# Patient Record
Sex: FEMALE | Race: WHITE | Employment: UNEMPLOYED | URBAN - METROPOLITAN AREA
[De-identification: names, ages, dates, MRNs, and addresses within clinical notes are randomized per-mention and may not be internally consistent; named-entity substitution may affect disease eponyms.]

---

## 2021-10-25 ENCOUNTER — APPOINTMENT (OUTPATIENT)
Dept: CT IMAGING | Age: 21
End: 2021-10-25
Payer: COMMERCIAL

## 2021-10-25 ENCOUNTER — APPOINTMENT (OUTPATIENT)
Dept: GENERAL RADIOLOGY | Age: 21
End: 2021-10-25
Payer: COMMERCIAL

## 2021-10-25 ENCOUNTER — HOSPITAL ENCOUNTER (EMERGENCY)
Age: 21
Discharge: HOME | End: 2021-10-25
Attending: EMERGENCY MEDICINE
Payer: COMMERCIAL

## 2021-10-25 VITALS
SYSTOLIC BLOOD PRESSURE: 149 MMHG | DIASTOLIC BLOOD PRESSURE: 66 MMHG | HEART RATE: 84 BPM | OXYGEN SATURATION: 98 % | TEMPERATURE: 98 F | RESPIRATION RATE: 16 BRPM

## 2021-10-25 DIAGNOSIS — K52.9 COLITIS: ICD-10-CM

## 2021-10-25 DIAGNOSIS — R10.31 ABDOMINAL PAIN, RIGHT LOWER QUADRANT: Primary | ICD-10-CM

## 2021-10-25 LAB
A/G RATIO: 1.1 (ref 1.1–2.2)
ABO/RH: NORMAL
ALBUMIN SERPL-MCNC: 3.9 G/DL (ref 3.4–5)
ALP BLD-CCNC: 64 U/L (ref 40–129)
ALT SERPL-CCNC: 18 U/L (ref 10–40)
ANION GAP SERPL CALCULATED.3IONS-SCNC: 12 MMOL/L (ref 3–16)
AST SERPL-CCNC: 14 U/L (ref 15–37)
BACTERIA: ABNORMAL /HPF
BASOPHILS ABSOLUTE: 0.1 K/UL (ref 0–0.2)
BASOPHILS RELATIVE PERCENT: 0.7 %
BILIRUB SERPL-MCNC: <0.2 MG/DL (ref 0–1)
BILIRUBIN URINE: NEGATIVE
BLOOD, URINE: NEGATIVE
BUN BLDV-MCNC: 7 MG/DL (ref 7–20)
CALCIUM SERPL-MCNC: 9.3 MG/DL (ref 8.3–10.6)
CHLORIDE BLD-SCNC: 110 MMOL/L (ref 99–110)
CLARITY: ABNORMAL
CO2: 20 MMOL/L (ref 21–32)
COLOR: YELLOW
CREAT SERPL-MCNC: 0.6 MG/DL (ref 0.6–1.1)
EOSINOPHILS ABSOLUTE: 0.4 K/UL (ref 0–0.6)
EOSINOPHILS RELATIVE PERCENT: 5.6 %
EPITHELIAL CELLS, UA: 7 /HPF (ref 0–5)
GFR AFRICAN AMERICAN: >60
GFR NON-AFRICAN AMERICAN: >60
GLOBULIN: 3.5 G/DL
GLUCOSE BLD-MCNC: 123 MG/DL (ref 70–99)
GLUCOSE URINE: NEGATIVE MG/DL
GONADOTROPIN, CHORIONIC (HCG) QUANT: <5 MIU/ML
HCT VFR BLD CALC: 38.2 % (ref 36–48)
HEMOGLOBIN: 12.7 G/DL (ref 12–16)
HYALINE CASTS: 2 /LPF (ref 0–8)
KETONES, URINE: NEGATIVE MG/DL
LEUKOCYTE ESTERASE, URINE: NEGATIVE
LIPASE: 40 U/L (ref 13–60)
LYMPHOCYTES ABSOLUTE: 1.9 K/UL (ref 1–5.1)
LYMPHOCYTES RELATIVE PERCENT: 23.2 %
MCH RBC QN AUTO: 27.5 PG (ref 26–34)
MCHC RBC AUTO-ENTMCNC: 33.2 G/DL (ref 31–36)
MCV RBC AUTO: 83 FL (ref 80–100)
MICROSCOPIC EXAMINATION: YES
MONOCYTES ABSOLUTE: 0.5 K/UL (ref 0–1.3)
MONOCYTES RELATIVE PERCENT: 5.7 %
NEUTROPHILS ABSOLUTE: 5.2 K/UL (ref 1.7–7.7)
NEUTROPHILS RELATIVE PERCENT: 64.8 %
NITRITE, URINE: NEGATIVE
PDW BLD-RTO: 14 % (ref 12.4–15.4)
PH UA: 5.5 (ref 5–8)
PLATELET # BLD: 208 K/UL (ref 135–450)
PMV BLD AUTO: 10.1 FL (ref 5–10.5)
POTASSIUM REFLEX MAGNESIUM: 4 MMOL/L (ref 3.5–5.1)
PRO-BNP: 33 PG/ML (ref 0–124)
PROTEIN UA: 100 MG/DL
RBC # BLD: 4.61 M/UL (ref 4–5.2)
RBC UA: 1 /HPF (ref 0–4)
SODIUM BLD-SCNC: 142 MMOL/L (ref 136–145)
SPECIFIC GRAVITY UA: 1.02 (ref 1–1.03)
TOTAL PROTEIN: 7.4 G/DL (ref 6.4–8.2)
TROPONIN: <0.01 NG/ML
URINE REFLEX TO CULTURE: ABNORMAL
URINE TYPE: ABNORMAL
UROBILINOGEN, URINE: 0.2 E.U./DL
WBC # BLD: 8 K/UL (ref 4–11)
WBC UA: 5 /HPF (ref 0–5)

## 2021-10-25 PROCEDURE — 99283 EMERGENCY DEPT VISIT LOW MDM: CPT

## 2021-10-25 PROCEDURE — 6360000004 HC RX CONTRAST MEDICATION: Performed by: PHYSICIAN ASSISTANT

## 2021-10-25 PROCEDURE — 84702 CHORIONIC GONADOTROPIN TEST: CPT

## 2021-10-25 PROCEDURE — 84484 ASSAY OF TROPONIN QUANT: CPT

## 2021-10-25 PROCEDURE — 85025 COMPLETE CBC W/AUTO DIFF WBC: CPT

## 2021-10-25 PROCEDURE — 6370000000 HC RX 637 (ALT 250 FOR IP): Performed by: PHYSICIAN ASSISTANT

## 2021-10-25 PROCEDURE — 86900 BLOOD TYPING SEROLOGIC ABO: CPT

## 2021-10-25 PROCEDURE — 83880 ASSAY OF NATRIURETIC PEPTIDE: CPT

## 2021-10-25 PROCEDURE — 93005 ELECTROCARDIOGRAM TRACING: CPT | Performed by: PHYSICIAN ASSISTANT

## 2021-10-25 PROCEDURE — 74177 CT ABD & PELVIS W/CONTRAST: CPT

## 2021-10-25 PROCEDURE — 86901 BLOOD TYPING SEROLOGIC RH(D): CPT

## 2021-10-25 PROCEDURE — 71045 X-RAY EXAM CHEST 1 VIEW: CPT

## 2021-10-25 PROCEDURE — 81001 URINALYSIS AUTO W/SCOPE: CPT

## 2021-10-25 PROCEDURE — 80053 COMPREHEN METABOLIC PANEL: CPT

## 2021-10-25 PROCEDURE — 83690 ASSAY OF LIPASE: CPT

## 2021-10-25 RX ORDER — ACETAMINOPHEN 500 MG
1000 TABLET ORAL ONCE
Status: COMPLETED | OUTPATIENT
Start: 2021-10-25 | End: 2021-10-25

## 2021-10-25 RX ORDER — METRONIDAZOLE 500 MG/1
500 TABLET ORAL 3 TIMES DAILY
Qty: 30 TABLET | Refills: 0 | Status: SHIPPED | OUTPATIENT
Start: 2021-10-25 | End: 2021-11-04

## 2021-10-25 RX ORDER — ONDANSETRON 4 MG/1
4 TABLET, ORALLY DISINTEGRATING ORAL EVERY 8 HOURS PRN
Qty: 20 TABLET | Refills: 0 | Status: SHIPPED | OUTPATIENT
Start: 2021-10-25

## 2021-10-25 RX ORDER — DOCUSATE SODIUM 100 MG/1
100 CAPSULE, LIQUID FILLED ORAL 2 TIMES DAILY
Qty: 60 CAPSULE | Refills: 0 | Status: SHIPPED | OUTPATIENT
Start: 2021-10-25 | End: 2021-11-24

## 2021-10-25 RX ORDER — DICYCLOMINE HYDROCHLORIDE 10 MG/1
10 CAPSULE ORAL EVERY 6 HOURS PRN
Qty: 20 CAPSULE | Refills: 0 | Status: SHIPPED | OUTPATIENT
Start: 2021-10-25

## 2021-10-25 RX ORDER — CIPROFLOXACIN 500 MG/1
500 TABLET, FILM COATED ORAL 2 TIMES DAILY
Qty: 20 TABLET | Refills: 0 | Status: SHIPPED | OUTPATIENT
Start: 2021-10-25 | End: 2021-11-04

## 2021-10-25 RX ADMIN — ACETAMINOPHEN 1000 MG: 500 TABLET ORAL at 14:33

## 2021-10-25 RX ADMIN — IOPAMIDOL 75 ML: 755 INJECTION, SOLUTION INTRAVENOUS at 17:13

## 2021-10-25 ASSESSMENT — ENCOUNTER SYMPTOMS
VOMITING: 0
PHOTOPHOBIA: 0
DIARRHEA: 0
CHEST TIGHTNESS: 0
COUGH: 0
COLOR CHANGE: 0
BACK PAIN: 0
SHORTNESS OF BREATH: 1
CONSTIPATION: 0
ABDOMINAL PAIN: 1
NAUSEA: 1

## 2021-10-25 ASSESSMENT — PAIN SCALES - GENERAL
PAINLEVEL_OUTOF10: 10
PAINLEVEL_OUTOF10: 10

## 2021-10-25 NOTE — ED PROVIDER NOTES
Phone [phone]   CBC WITH AUTO DIFFERENTIAL    Narrative:     Performed at:  [facility] [address] [facility],  [place], [address]   Phone [phone]   LIPASE    Narrative:     Performed at:  [facility]  [address] [facility],  [place], [address]   Phone [phone]   HCG, QUANTITATIVE, PREGNANCY    Narrative:     Performed at:  [facility] [address],  [place], [address]   Phone [phone]   TROPONIN    Narrative:     Performed at:  [facility] [address],  [place], [address]   Phone [phone]   BRAIN NATRIURETIC PEPTIDE    Narrative:     Performed at:  [facility] [address] [facility],  [place], [address]   Phone [phone]   ABO/RH    Narrative:     Performed at:  [facility] [address],  [place], [address]   Phone [phone]               Exam:    Well-nourished female sitting up in bed in no acute distress. Abdomen is soft and benign with no guarding or rebound. Medical decision making:    The patient has remained stable throughout her hospital course. Her work-up was extensive including a pregnancy test that was negative. A CT of the abdomen pelvis shows some possible colitis. The radiologist also stated that tumor is on the differential however, the patient is having normal bowel movements. All of these findings were discussed with the patient. The patient was discharged in stable condition with antibiotics for possible colitis. She is to return if worse. FINAL IMPRESSION:    1. Abdominal pain, right lower quadrant    2.  Colitis            Glory Hernandez MD  10/25/21 0893

## 2021-10-25 NOTE — ED PROVIDER NOTES
[address] [facility]        Pt Name: Sandra Lovett  MRN: [id number]  [place] 2000  Date of evaluation: 10/25/2021  Provider: BECK Stuart  PCP: No primary care provider on file. Note Started: 2:45 PM EDT        I have seen and evaluated this patient with my supervising physician Katie Recinos MD.    CHIEF COMPLAINT       Chief Complaint   Patient presents with    Emesis     states LMP 3 months ago, believes she is pregnant, never took test, vomiting for a few weeks       HISTORY OF PRESENT ILLNESS   (Location, Timing/Onset, Context/Setting, Quality, Duration, Modifying Factors, Severity, Associated Signs and Symptoms)  Note limiting factors. Chief Complaint: Nausea/questionable pregnancy    Sandra Lovett is a 22 y.o. female with no significant past medical history who presents the ED with complaint of possible pregnancy with associated nausea. Patient states she has not had a menstrual cycle in about the past 3 months. States she believes her last menstrual cycle was on July 16, 2021. Patient states over the past month she has had some pain to her right-sided abdomen intermittently. Patient states she is also had associated nausea but denies any vomiting. States she is also had some feelings of shortness of breath with swelling to her hands and legs. Denies any calf pain, headache, lightheadedness/dizziness, seizure, visual changes or speech disturbances. Denies any numbness or tingling. Denies any vaginal bleeding or discharge. Denies any dysuria, frequency, urgency or hematuria. Denies any changes in bowel movements. Denies fever chills. Denies any rashes or lesions. Patient states her family is concerned that she is pregnant. Patient states if she was pregnant today she would be G3, P1 Ab1.   Patient states she had previous tubal pregnancy on the right where she states they did take out her right \"tube\". Has not establish care with OB/GYN at this time. Has not been seen for evaluation in this pregnancy. Nursing Notes were all reviewed and agreed with or any disagreements were addressed in the HPI. REVIEW OF SYSTEMS    (2-9 systems for level 4, 10 or more for level 5)     Review of Systems   Constitutional: Negative for activity change, appetite change, chills, diaphoresis, fatigue and fever. Eyes: Negative for photophobia and visual disturbance. Respiratory: Positive for shortness of breath. Negative for cough and chest tightness. Cardiovascular: Positive for leg swelling. Negative for chest pain and palpitations. Gastrointestinal: Positive for abdominal pain and nausea. Negative for constipation, diarrhea and vomiting. Genitourinary: Negative for decreased urine volume, difficulty urinating, dysuria, flank pain, frequency, hematuria, menstrual problem, pelvic pain, urgency, vaginal bleeding, vaginal discharge and vaginal pain. Musculoskeletal: Negative for arthralgias, back pain, myalgias, neck pain and neck stiffness. Skin: Negative for color change, pallor, rash and wound. Neurological: Negative for dizziness, tremors, seizures, syncope, facial asymmetry, speech difficulty, weakness, light-headedness, numbness and headaches. Positives and Pertinent negatives as per HPI. Except as noted above in the ROS, all other systems were reviewed and negative. PAST MEDICAL HISTORY   History reviewed. No pertinent past medical history. SURGICAL HISTORY   History reviewed. No pertinent surgical history. CURRENTMEDICATIONS       Previous Medications    No medications on file         ALLERGIES     Patient has no known allergies. FAMILYHISTORY     History reviewed. No pertinent family history.        SOCIAL HISTORY       Social History     Tobacco Use    Smoking status: Never Smoker    Smokeless tobacco: Never Used   Substance Use Topics    Alcohol use: Never    Drug use: Not on file       SCREENINGS             PHYSICAL EXAM    (up to 7 for level 4, 8 or more for level 5)     ED Triage Vitals [10/25/21 1417]   BP Temp Temp src Pulse Resp SpO2 Height Weight   (!) 149/66 98 °F (36.7 °C) -- 84 16 98 % -- --       Physical Exam  Constitutional:       General: She is not in acute distress. Appearance: Normal appearance. She is well-developed. She is not ill-appearing, toxic-appearing or diaphoretic. HENT:      Head: Normocephalic and atraumatic. Right Ear: External ear normal.      Left Ear: External ear normal.   Eyes:      General:         Right eye: No discharge. Left eye: No discharge. Extraocular Movements: Extraocular movements intact. Conjunctiva/sclera: Conjunctivae normal.      Pupils: Pupils are equal, round, and reactive to light. Cardiovascular:      Rate and Rhythm: Normal rate and regular rhythm. Pulses: Normal pulses. Heart sounds: Normal heart sounds. No murmur heard. No friction rub. No gallop. Comments: 2+ radial pulses bilaterally. No pedal edema. No calf tenderness. No JVD. Pulmonary:      Effort: Pulmonary effort is normal. No respiratory distress. Breath sounds: Normal breath sounds. No stridor. No wheezing, rhonchi or rales. Chest:      Chest wall: No tenderness. Abdominal:      General: Abdomen is flat. Bowel sounds are normal. There is no distension. Palpations: Abdomen is soft. There is no mass. Tenderness: There is abdominal tenderness in the right upper quadrant, right lower quadrant, epigastric area, periumbilical area and suprapubic area. There is no right CVA tenderness, left CVA tenderness, guarding or rebound. Positive signs include McBurney's sign. Negative signs include Allen's sign, Rovsing's sign, psoas sign and obturator sign. Hernia: No hernia is present. Musculoskeletal:         General: Normal range of motion.       Cervical back: Normal range of motion and neck supple. Skin:     General: Skin is warm and dry. Coloration: Skin is not pale. Findings: No erythema or rash. Neurological:      General: No focal deficit present. Mental Status: She is alert and oriented to person, place, and time. GCS: GCS eye subscore is 4. GCS verbal subscore is 5. GCS motor subscore is 6. Cranial Nerves: Cranial nerves are intact. No cranial nerve deficit, dysarthria or facial asymmetry. Sensory: Sensation is intact. No sensory deficit. Motor: Motor function is intact. No weakness. Coordination: Coordination is intact. Gait: Gait is intact.  Gait normal.   Psychiatric:         Behavior: Behavior normal.         DIAGNOSTIC RESULTS   LABS:    Labs Reviewed   COMPREHENSIVE METABOLIC PANEL W/ REFLEX TO MG FOR LOW K - Abnormal; Notable for the following components:       Result Value    CO2 20 (*)     Glucose 123 (*)     AST 14 (*)     All other components within normal limits    Narrative:     Performed at:  [facility] [address]   Phone [phone]   URINE RT REFLEX TO CULTURE - Abnormal; Notable for the following components:    Clarity, UA CLOUDY (*)     Protein,  (*)     All other components within normal limits    Narrative:     Performed at:  [facility] [address]  [place]   Phone [phone]   MICROSCOPIC URINALYSIS - Abnormal; Notable for the following components:    Bacteria, UA 1+ (*)     Epithelial Cells, UA 7 (*)     All other components within normal limits    Narrative:     Performed at:  [facility] [address],  [place], [place]   Phone [phone]   CBC WITH AUTO DIFFERENTIAL    Narrative:     Performed at:  [facility] [address], [place], [place]   Phone [phone]   LIPASE    Narrative:     Performed at:  [facility]) - [facility]  [address], [address]   Phone [phone]   HCG, QUANTITATIVE, PREGNANCY    Narrative:     Performed at:  [facility]  [address], [address]   Phone [phone]   TROPONIN    Narrative:     Performed at:  [facility]  [address] [facility],  [place], [address]   Phone [phone]   BRAIN NATRIURETIC PEPTIDE    Narrative:     Performed at:  [facility]  [address] [facility],  [place], [address]   Phone [phone]   ABO/RH    Narrative:     Performed at:  [facility]  [address] [address]   Phone [phone]       When ordered only abnormal lab results are displayed. All other labs were within normal range or not returned as of this dictation. EKG: When ordered, EKG's are interpreted by the Emergency Department Physician in the absence of a cardiologist.  Please see their note for interpretation of EKG. RADIOLOGY:   Non-plain film images such as CT, Ultrasound and MRI are read by the radiologist. Plain radiographic images are visualized and preliminarily interpreted by the ED Provider with the below findings:        Interpretation per the Radiologist below, if available at the time of this note:    CT ABDOMEN PELVIS W IV CONTRAST Additional Contrast? None   Final Result   1. Questionable wall thickening of the cecum with suboptimal evaluation due   to lack of enteric contrast.  Inspissated stool, colitis, and tumor are in   the differential.   2. No appendicitis, diverticulitis, or small bowel obstruction. 3. Other findings as described. XR CHEST PORTABLE   Final Result   No active cardiopulmonary disease           No results found.         PROCEDURES   Unless otherwise noted below, none     Procedures    CRITICAL CARE TIME   N/A    CONSULTS:  None      EMERGENCY DEPARTMENT COURSE and DIFFERENTIAL DIAGNOSIS/MDM:   Vitals:    Vitals:    10/25/21 1417   BP: (!) 149/66   Pulse: 84   Resp: 16   Temp: 98 °F (36.7 °C)   SpO2: 98%       Patient was given the following medications:  Medications   acetaminophen (TYLENOL) tablet 1,000 mg (1,000 mg Oral Given 10/25/21 1433)   iopamidol (ISOVUE-370) 76 % injection 75 mL (75 mLs IntraVENous Given 10/25/21 1713)           Patient is a 81-year-old female who presents to the ED with complaint of abdominal pain. Complaint of lower abdominal pain. States she thinks she believes she is pregnant. Has not had a menstrual cycle since July. IV established and blood work obtained. Patient given Tylenol here in the ED for symptom control. CBC showed a white count, hemoglobin and platelets. CMP relatively unremarkable. Lipase normal.  Blood type a positive. hCG quant was essentially negative. Troponin was normal.  BNP unremarkable. Urinalysis showed no signs of infection. CT of the abdomen pelvis showed questionable wall thickening to the cecum. Could be due to tumor versus colitis versus stool. No evidence of appendicitis or small bowel obstruction. EKG inter by attending. Chest x-ray unremarkable. Patient does not appear to be pregnant. Suffering from right lower quadrant belly pain which potentially could be due to underlying colitis. We will discharge home with Cipro and Flagyl. We will give Colace, Bentyl and Zofran for symptom control. Follow-up with PCP. Given referral.  Return the ED for any worsening symptoms. Low suspicion for obstruction, peritonitis, perforation, pancreatitis, cholecystitis, sinusitis, diverticulitis, mesenteric ischemia, AAA, dissection, pyelonephritis, nephrolithiasis, ovarian etiology, ectopic pregnancy or other emergent etiology at this time. FINAL IMPRESSION      1. Abdominal pain, right lower quadrant    2.  Colitis          DISPOSITION/PLAN   DISPOSITION Discharge - Pending Orders Complete 10/25/2021 06:53:47 PM      PATIENT REFERRED TO:  [facility] Emergency Department  [address]  [address]  Go to   As needed, If symptoms worsen    [facility]) Pre-Services  [phone]          DISCHARGE MEDICATIONS:  New Prescriptions    CIPROFLOXACIN (CIPRO) 500 MG TABLET    Take 1 tablet by mouth 2 times daily for 10 days    DICYCLOMINE (BENTYL) 10 MG CAPSULE    Take 1 capsule by mouth every 6 hours as needed (cramps)    DOCUSATE SODIUM (COLACE) 100 MG CAPSULE    Take 1 capsule by mouth 2 times daily    METRONIDAZOLE (FLAGYL) 500 MG TABLET    Take 1 tablet by mouth 3 times daily for 10 days    ONDANSETRON (ZOFRAN ODT) 4 MG DISINTEGRATING TABLET    Take 1 tablet by mouth every 8 hours as needed for Nausea       DISCONTINUED MEDICATIONS:  Discontinued Medications    No medications on file              (Please note that portions of this note were completed with a voice recognition program.  Efforts were made to edit the dictations but occasionally words are mis-transcribed.)    BECK Woods (electronically signed)         BECK Gant  10/25/21 5340

## 2021-10-26 LAB
EKG ATRIAL RATE: 67 BPM
EKG DIAGNOSIS: NORMAL
EKG P AXIS: 35 DEGREES
EKG P-R INTERVAL: 138 MS
EKG Q-T INTERVAL: 374 MS
EKG QRS DURATION: 98 MS
EKG QTC CALCULATION (BAZETT): 395 MS
EKG R AXIS: 60 DEGREES
EKG T AXIS: 28 DEGREES
EKG VENTRICULAR RATE: 67 BPM

## 2021-10-26 PROCEDURE — 93010 ELECTROCARDIOGRAM REPORT: CPT | Performed by: INTERNAL MEDICINE

## 2022-07-29 ENCOUNTER — APPOINTMENT (OUTPATIENT)
Dept: CT IMAGING | Age: 22
End: 2022-07-29
Payer: COMMERCIAL

## 2022-07-29 ENCOUNTER — HOSPITAL ENCOUNTER (EMERGENCY)
Age: 22
Discharge: HOME | End: 2022-07-29
Attending: EMERGENCY MEDICINE
Payer: COMMERCIAL

## 2022-07-29 VITALS
OXYGEN SATURATION: 98 % | TEMPERATURE: 98.5 F | BODY MASS INDEX: 34.53 KG/M2 | RESPIRATION RATE: 22 BRPM | HEART RATE: 96 BPM | WEIGHT: 220 LBS | SYSTOLIC BLOOD PRESSURE: 105 MMHG | HEIGHT: 67 IN | DIASTOLIC BLOOD PRESSURE: 72 MMHG

## 2022-07-29 DIAGNOSIS — R10.9 LEFT FLANK PAIN: Primary | ICD-10-CM

## 2022-07-29 LAB
A/G RATIO: 1.4 (ref 1.1–2.2)
ABO/RH: NORMAL
ALBUMIN SERPL-MCNC: 4.3 G/DL (ref 3.4–5)
ALP BLD-CCNC: 59 U/L (ref 40–129)
ALT SERPL-CCNC: 13 U/L (ref 10–40)
ANION GAP SERPL CALCULATED.3IONS-SCNC: 9 MMOL/L (ref 3–16)
ANTIBODY SCREEN: NORMAL
AST SERPL-CCNC: 15 U/L (ref 15–37)
BACTERIA: NORMAL /HPF
BASOPHILS ABSOLUTE: 0.1 K/UL (ref 0–0.2)
BASOPHILS RELATIVE PERCENT: 0.5 %
BILIRUB SERPL-MCNC: <0.2 MG/DL (ref 0–1)
BILIRUBIN URINE: NEGATIVE
BLOOD, URINE: NEGATIVE
BUN BLDV-MCNC: 11 MG/DL (ref 7–20)
CALCIUM SERPL-MCNC: 8.8 MG/DL (ref 8.3–10.6)
CHLORIDE BLD-SCNC: 103 MMOL/L (ref 99–110)
CLARITY: CLEAR
CO2: 25 MMOL/L (ref 21–32)
COLOR: YELLOW
CREAT SERPL-MCNC: <0.5 MG/DL (ref 0.6–1.1)
EOSINOPHILS ABSOLUTE: 0.4 K/UL (ref 0–0.6)
EOSINOPHILS RELATIVE PERCENT: 3.4 %
EPITHELIAL CELLS, UA: 1 /HPF (ref 0–5)
GFR AFRICAN AMERICAN: >60
GFR NON-AFRICAN AMERICAN: >60
GLUCOSE BLD-MCNC: 120 MG/DL (ref 70–99)
GLUCOSE URINE: NEGATIVE MG/DL
GONADOTROPIN, CHORIONIC (HCG) QUANT: <5 MIU/ML
HCG(URINE) PREGNANCY TEST: NEGATIVE
HCT VFR BLD CALC: 37.7 % (ref 36–48)
HEMOGLOBIN: 12.2 G/DL (ref 12–16)
HYALINE CASTS: 0 /LPF (ref 0–8)
KETONES, URINE: NEGATIVE MG/DL
LEUKOCYTE ESTERASE, URINE: ABNORMAL
LYMPHOCYTES ABSOLUTE: 2.3 K/UL (ref 1–5.1)
LYMPHOCYTES RELATIVE PERCENT: 21.1 %
MCH RBC QN AUTO: 27 PG (ref 26–34)
MCHC RBC AUTO-ENTMCNC: 32.2 G/DL (ref 31–36)
MCV RBC AUTO: 83.8 FL (ref 80–100)
MICROSCOPIC EXAMINATION: YES
MONOCYTES ABSOLUTE: 0.8 K/UL (ref 0–1.3)
MONOCYTES RELATIVE PERCENT: 6.8 %
NEUTROPHILS ABSOLUTE: 7.5 K/UL (ref 1.7–7.7)
NEUTROPHILS RELATIVE PERCENT: 68.2 %
NITRITE, URINE: NEGATIVE
PDW BLD-RTO: 14.8 % (ref 12.4–15.4)
PH UA: 7 (ref 5–8)
PLATELET # BLD: 223 K/UL (ref 135–450)
PMV BLD AUTO: 9 FL (ref 5–10.5)
POTASSIUM SERPL-SCNC: 4 MMOL/L (ref 3.5–5.1)
PROTEIN UA: 30 MG/DL
RBC # BLD: 4.5 M/UL (ref 4–5.2)
RBC UA: 3 /HPF (ref 0–4)
SODIUM BLD-SCNC: 137 MMOL/L (ref 136–145)
SPECIFIC GRAVITY UA: 1.02 (ref 1–1.03)
TOTAL PROTEIN: 7.4 G/DL (ref 6.4–8.2)
URINE TYPE: ABNORMAL
UROBILINOGEN, URINE: 2 E.U./DL
WBC # BLD: 11.1 K/UL (ref 4–11)
WBC UA: 1 /HPF (ref 0–5)

## 2022-07-29 PROCEDURE — 99284 EMERGENCY DEPT VISIT MOD MDM: CPT

## 2022-07-29 PROCEDURE — 80053 COMPREHEN METABOLIC PANEL: CPT

## 2022-07-29 PROCEDURE — 96374 THER/PROPH/DIAG INJ IV PUSH: CPT

## 2022-07-29 PROCEDURE — 84703 CHORIONIC GONADOTROPIN ASSAY: CPT

## 2022-07-29 PROCEDURE — 86900 BLOOD TYPING SEROLOGIC ABO: CPT

## 2022-07-29 PROCEDURE — 86901 BLOOD TYPING SEROLOGIC RH(D): CPT

## 2022-07-29 PROCEDURE — 86850 RBC ANTIBODY SCREEN: CPT

## 2022-07-29 PROCEDURE — 6370000000 HC RX 637 (ALT 250 FOR IP): Performed by: EMERGENCY MEDICINE

## 2022-07-29 PROCEDURE — 85025 COMPLETE CBC W/AUTO DIFF WBC: CPT

## 2022-07-29 PROCEDURE — 96375 TX/PRO/DX INJ NEW DRUG ADDON: CPT

## 2022-07-29 PROCEDURE — 84702 CHORIONIC GONADOTROPIN TEST: CPT

## 2022-07-29 PROCEDURE — 6360000002 HC RX W HCPCS: Performed by: EMERGENCY MEDICINE

## 2022-07-29 PROCEDURE — 81001 URINALYSIS AUTO W/SCOPE: CPT

## 2022-07-29 PROCEDURE — 36415 COLL VENOUS BLD VENIPUNCTURE: CPT

## 2022-07-29 PROCEDURE — 74176 CT ABD & PELVIS W/O CONTRAST: CPT

## 2022-07-29 RX ORDER — ACETAMINOPHEN 500 MG
1000 TABLET ORAL ONCE
Status: COMPLETED | OUTPATIENT
Start: 2022-07-29 | End: 2022-07-29

## 2022-07-29 RX ORDER — ONDANSETRON 2 MG/ML
4 INJECTION INTRAMUSCULAR; INTRAVENOUS EVERY 6 HOURS PRN
Status: DISCONTINUED | OUTPATIENT
Start: 2022-07-29 | End: 2022-07-29 | Stop reason: HOSPADM

## 2022-07-29 RX ORDER — KETOROLAC TROMETHAMINE 10 MG/1
10 TABLET, FILM COATED ORAL EVERY 6 HOURS PRN
Qty: 20 TABLET | Refills: 0 | Status: SHIPPED | OUTPATIENT
Start: 2022-07-29

## 2022-07-29 RX ORDER — KETOROLAC TROMETHAMINE 30 MG/ML
15 INJECTION, SOLUTION INTRAMUSCULAR; INTRAVENOUS ONCE
Status: COMPLETED | OUTPATIENT
Start: 2022-07-29 | End: 2022-07-29

## 2022-07-29 RX ADMIN — KETOROLAC TROMETHAMINE 15 MG: 30 INJECTION, SOLUTION INTRAMUSCULAR at 03:11

## 2022-07-29 RX ADMIN — ONDANSETRON 4 MG: 2 INJECTION INTRAMUSCULAR; INTRAVENOUS at 02:04

## 2022-07-29 RX ADMIN — ACETAMINOPHEN 1000 MG: 500 TABLET ORAL at 02:03

## 2022-07-29 ASSESSMENT — PAIN SCALES - GENERAL
PAINLEVEL_OUTOF10: 3
PAINLEVEL_OUTOF10: 10
PAINLEVEL_OUTOF10: 10

## 2022-07-29 ASSESSMENT — PAIN DESCRIPTION - ORIENTATION
ORIENTATION: LEFT

## 2022-07-29 ASSESSMENT — PAIN DESCRIPTION - LOCATION
LOCATION: RIB CAGE

## 2022-07-29 ASSESSMENT — PAIN DESCRIPTION - FREQUENCY: FREQUENCY: CONTINUOUS

## 2022-07-29 ASSESSMENT — PAIN - FUNCTIONAL ASSESSMENT: PAIN_FUNCTIONAL_ASSESSMENT: 0-10

## 2022-07-29 NOTE — ED PROVIDER NOTES
[address] [facility]        Pt Name: Shari Orozco  MRN: [id number]  [place] 2000  Date of evaluation: 7/29/2022  Provider: Sari Najera MD  PCP: No primary care provider on file. This patient was seen and evaluated by the attending physician Sari Najera MD.      [address] [place] [address]       Chief Complaint   Patient presents with    Rib Pain (injury)     Pt arrived via [facility] from home w c/o of left sided pain that started 5 days ago from rib cage and progressed top lower body. Current pain level (10/10)       HISTORY OF PRESENT ILLNESS   (Location/Symptom, Timing/Onset, Context/Setting, Quality, Duration, Modifying Factors, Severity)  Note limiting factors. Shari Orozco is a 22 y.o. female today with chief complaint of left-sided flank pain for the past few days. Not much of a cogent historian. From what I can work out, she does complain of left-sided flank pain intermittently for the past week. LMP of June 19. No fevers chills sweats nausea running or diarrhea. No VB or Discharge. Has been having unprotected sex. Nursing Notes were all reviewed and agreed with or any disagreements were addressed  in the HPI. REVIEW OF SYSTEMS    (2-9 systems for level 4, 10 or more for level 5)     Review of Systems    Positives and Pertinent negatives as per HPI. Except as noted abovein the ROS, all other systems were reviewed and negative. PAST MEDICAL HISTORY   History reviewed. No pertinent past medical history. SURGICAL HISTORY   History reviewed. No pertinent surgical history.       CURRENTMEDICATIONS       Previous Medications    DICYCLOMINE (BENTYL) 10 MG CAPSULE    Take 1 capsule by mouth every 6 hours as needed (cramps)    ONDANSETRON (ZOFRAN ODT) 4 MG DISINTEGRATING TABLET    Take 1 tablet by mouth every 8 hours as needed for Nausea         ALLERGIES     Patient has no known allergies. FAMILYHISTORY     History reviewed. No pertinent family history. SOCIAL HISTORY       Social History     Socioeconomic History    Marital status: Single     Spouse name: None    Number of children: None    Years of education: None    Highest education level: None   Tobacco Use    Smoking status: Every Day     Types: Cigarettes    Smokeless tobacco: Never   Substance and Sexual Activity    Alcohol use: Never    Drug use: Never       SCREENINGS    [place] Coma Scale  Eye Opening: Spontaneous  Best Verbal Response: Oriented  Best Motor Response: Obeys commands  [place] Coma Scale Score: 15        PHYSICAL EXAM    (up to 7 for level 4, 8 or more for level 5)     ED Triage Vitals   BP Temp Temp src Pulse Resp SpO2 Height Weight   -- -- -- -- -- -- -- --       Physical Exam    General Appearance:  Alert, cooperative, no distress, appears stated age. Head:  Normocephalic, without obvious abnormality, atraumatic. Eyes:  conjunctiva/corneas clear, EOM's intact. Sclera anicteric. ENT: Mucous membranes moist.   Neck: Supple, symmetrical, trachea midline, no adenopathy. No jugular venous distention. Lungs:   No Respiratory Distress. Chest Wall:  Atraumatic   Heart:  RRR   Abdomen:   Soft, NT, ND   Extremities:  Full range of motion. Pulses: Symmetric x 4   Skin:  No rashes or lesions to exposed skin. Neurologic: Alert and oriented X 3. Motor grossly normal.  Speech clear.           DIAGNOSTIC RESULTS   LABS:    Labs Reviewed   CBC WITH AUTO DIFFERENTIAL - Abnormal; Notable for the following components:       Result Value    WBC 11.1 (*)     All other components within normal limits   COMPREHENSIVE METABOLIC PANEL - Abnormal; Notable for the following components:    Glucose 120 (*)     Creatinine <0.5 (*)     All other components within normal limits   URINALYSIS - Abnormal; Notable for the following components:    Protein, UA 30 (*)     Urobilinogen, Urine 2.0 (*)     Leukocyte Esterase, Urine TRACE (*)     All other components within normal limits   HCG, QUANTITATIVE, PREGNANCY   PREGNANCY, URINE   MICROSCOPIC URINALYSIS   TYPE AND SCREEN       All other labs were within normal range or not returned as of this dictation. EKG: All EKG's are interpreted by the Emergency Department Physician in the absence of a cardiologist.  Please see their note for interpretation of EKG. RADIOLOGY:   Non-plain film images such as CT, Ultrasound and MRI are read by the radiologist. Plain radiographic images are visualized andpreliminarily interpreted by the  ED Provider with the below findings:        Interpretation perthe Radiologist below, if available at the time of this note:    CT ABDOMEN PELVIS WO CONTRAST Additional Contrast? None   Final Result   Faint nonobstructing left renal calculi. No acute inflammatory process   identified. No results found. PROCEDURES   Unless otherwise noted below, none     Procedures    CRITICAL CARE TIME   N/A    CONSULTS:  None      EMERGENCY DEPARTMENT COURSE and DIFFERENTIAL DIAGNOSIS/MDM:   Vitals:    Vitals:    07/29/22 0129   BP: 105/72   Pulse: 96   Resp: 22   Temp: 98.5 °F (36.9 °C)   SpO2: 98%   Weight: 220 lb (99.8 kg)   Height: 5' 7\" (1.702 m)       Patient was given thefollowing medications:  Medications   ondansetron (ZOFRAN) injection 4 mg (4 mg IntraVENous Given 7/29/22 0204)   acetaminophen (TYLENOL) tablet 1,000 mg (1,000 mg Oral Given 7/29/22 0203)   ketorolac (TORADOL) injection 15 mg (15 mg IntraVENous Given 7/29/22 0311)       21yoF with concern for pregnancy  Clinically benign exam  In for labs, UA, Preg. Preg neg. UA benign  Labs benign. Gave toradol  CT Abd/Pel showed no acute process; ? Tiny intrarenal calculi. UA benign. DC home, F/u PCP. Is this patient to be included in the SEP-1 Core Measure? No   Exclusion criteria - the patient is NOT to be included for SEP-1 Core Measure due to:   Infection is not suspected FINAL IMPRESSION      1.  Left flank pain          DISPOSITION/PLAN   DISPOSITION Decision To Discharge 07/29/2022 03:12:21 AM      PATIENT REFERREDTO:  Your PCP          DISCHARGE MEDICATIONS:  New Prescriptions    KETOROLAC (TORADOL) 10 MG TABLET    Take 1 tablet by mouth every 6 hours as needed for Pain       DISCONTINUED MEDICATIONS:  Discontinued Medications    No medications on file              (Please note that portions ofthis note were completed with a voice recognition program.  Efforts were made to edit the dictations but occasionally words are mis-transcribed.)    Lisbet Davis MD (electronically signed)           Lisbet Davis MD  07/29/22 5107